# Patient Record
Sex: MALE | Race: WHITE
[De-identification: names, ages, dates, MRNs, and addresses within clinical notes are randomized per-mention and may not be internally consistent; named-entity substitution may affect disease eponyms.]

---

## 2018-01-06 ENCOUNTER — HOSPITAL ENCOUNTER (EMERGENCY)
Dept: HOSPITAL 12 - ER | Age: 28
Discharge: HOME | End: 2018-01-06
Payer: COMMERCIAL

## 2018-01-06 VITALS — WEIGHT: 260 LBS | BODY MASS INDEX: 35.21 KG/M2 | HEIGHT: 72 IN

## 2018-01-06 VITALS — DIASTOLIC BLOOD PRESSURE: 73 MMHG | SYSTOLIC BLOOD PRESSURE: 121 MMHG

## 2018-01-06 DIAGNOSIS — F90.9: ICD-10-CM

## 2018-01-06 DIAGNOSIS — L02.212: Primary | ICD-10-CM

## 2018-01-06 DIAGNOSIS — Z91.018: ICD-10-CM

## 2018-01-06 PROCEDURE — A4663 DIALYSIS BLOOD PRESSURE CUFF: HCPCS

## 2018-01-06 NOTE — NUR
Patient discharged to home in stable conditon.  Written and verbal after care 
instructions given. 

Patient verbalizes understanding of instructions. Ambulated from ER with stable 
gait. All belongings with patient. Accompanied by father from ER.

## 2018-01-10 ENCOUNTER — HOSPITAL ENCOUNTER (EMERGENCY)
Dept: HOSPITAL 12 - ER | Age: 28
Discharge: HOME | End: 2018-01-10
Payer: COMMERCIAL

## 2018-01-10 VITALS — WEIGHT: 260 LBS | HEIGHT: 72 IN | BODY MASS INDEX: 35.21 KG/M2

## 2018-01-10 VITALS — DIASTOLIC BLOOD PRESSURE: 81 MMHG | SYSTOLIC BLOOD PRESSURE: 125 MMHG

## 2018-01-10 DIAGNOSIS — Z48.00: Primary | ICD-10-CM

## 2018-01-10 DIAGNOSIS — Z91.018: ICD-10-CM

## 2018-01-10 DIAGNOSIS — F90.9: ICD-10-CM

## 2018-01-10 PROCEDURE — A4217 STERILE WATER/SALINE, 500 ML: HCPCS

## 2018-01-10 PROCEDURE — A4663 DIALYSIS BLOOD PRESSURE CUFF: HCPCS

## 2019-12-28 ENCOUNTER — HOSPITAL ENCOUNTER (EMERGENCY)
Age: 29
Discharge: HOME | End: 2019-12-28

## 2019-12-28 DIAGNOSIS — J06.9: Primary | ICD-10-CM

## 2019-12-28 DIAGNOSIS — Z79.899: ICD-10-CM

## 2019-12-28 DIAGNOSIS — Z91.018: ICD-10-CM

## 2022-10-15 ENCOUNTER — HOSPITAL ENCOUNTER (EMERGENCY)
Dept: HOSPITAL 12 - ER | Age: 32
Discharge: HOME | End: 2022-10-15
Payer: COMMERCIAL

## 2022-10-15 VITALS — BODY MASS INDEX: 31.56 KG/M2 | HEIGHT: 72 IN | WEIGHT: 233 LBS

## 2022-10-15 VITALS — SYSTOLIC BLOOD PRESSURE: 135 MMHG | DIASTOLIC BLOOD PRESSURE: 68 MMHG

## 2022-10-15 DIAGNOSIS — R23.8: ICD-10-CM

## 2022-10-15 DIAGNOSIS — L03.116: Primary | ICD-10-CM

## 2022-10-15 DIAGNOSIS — Z91.018: ICD-10-CM

## 2022-10-15 DIAGNOSIS — E66.9: ICD-10-CM

## 2022-10-15 PROCEDURE — A4663 DIALYSIS BLOOD PRESSURE CUFF: HCPCS

## 2022-10-15 NOTE — NUR
Patient discharged to home in stable condition.patient went home with all 
belongings .went home with father .wheeled patient via wheelchair to private 
automobile . Written and verbal after care instructions given.v/s wnl . no 
respiratory distress .  

Patient verbalizes understanding of instructions. Stressed follow up or return 
to ER for worsening s/s.

## 2024-09-11 ENCOUNTER — HOSPITAL ENCOUNTER (EMERGENCY)
Dept: HOSPITAL 12 - ER | Age: 34
Discharge: HOME | End: 2024-09-11
Payer: COMMERCIAL

## 2024-09-11 VITALS — DIASTOLIC BLOOD PRESSURE: 79 MMHG | SYSTOLIC BLOOD PRESSURE: 125 MMHG | TEMPERATURE: 98 F | OXYGEN SATURATION: 97 %

## 2024-09-11 VITALS — WEIGHT: 260 LBS | HEIGHT: 70 IN | BODY MASS INDEX: 37.22 KG/M2

## 2024-09-11 DIAGNOSIS — J40: ICD-10-CM

## 2024-09-11 DIAGNOSIS — E66.9: ICD-10-CM

## 2024-09-11 DIAGNOSIS — Z20.822: ICD-10-CM

## 2024-09-11 DIAGNOSIS — Z79.891: ICD-10-CM

## 2024-09-11 DIAGNOSIS — Z88.8: ICD-10-CM

## 2024-09-11 DIAGNOSIS — Z79.899: ICD-10-CM

## 2024-09-11 DIAGNOSIS — J18.9: Primary | ICD-10-CM

## 2024-09-11 DIAGNOSIS — F32.A: ICD-10-CM

## 2024-09-11 PROCEDURE — A4606 OXYGEN PROBE USED W OXIMETER: HCPCS

## 2024-09-11 PROCEDURE — A4663 DIALYSIS BLOOD PRESSURE CUFF: HCPCS

## 2024-10-10 ENCOUNTER — HOSPITAL ENCOUNTER (EMERGENCY)
Dept: HOSPITAL 12 - ER | Age: 34
Discharge: HOME | End: 2024-10-10
Payer: COMMERCIAL

## 2024-10-10 VITALS — WEIGHT: 240 LBS | HEIGHT: 70 IN | BODY MASS INDEX: 34.36 KG/M2

## 2024-10-10 VITALS — DIASTOLIC BLOOD PRESSURE: 78 MMHG | SYSTOLIC BLOOD PRESSURE: 132 MMHG | TEMPERATURE: 98.3 F | OXYGEN SATURATION: 97 %

## 2024-10-10 DIAGNOSIS — E66.9: ICD-10-CM

## 2024-10-10 DIAGNOSIS — Z87.01: ICD-10-CM

## 2024-10-10 DIAGNOSIS — Z79.899: ICD-10-CM

## 2024-10-10 DIAGNOSIS — F32.A: ICD-10-CM

## 2024-10-10 DIAGNOSIS — J32.9: Primary | ICD-10-CM

## 2024-10-10 PROCEDURE — A4606 OXYGEN PROBE USED W OXIMETER: HCPCS

## 2024-10-10 PROCEDURE — A4663 DIALYSIS BLOOD PRESSURE CUFF: HCPCS

## 2024-10-25 ENCOUNTER — HOSPITAL ENCOUNTER (EMERGENCY)
Dept: HOSPITAL 12 - ER | Age: 34
Discharge: HOME | End: 2024-10-25
Payer: COMMERCIAL

## 2024-10-25 VITALS — SYSTOLIC BLOOD PRESSURE: 131 MMHG | TEMPERATURE: 98 F | OXYGEN SATURATION: 95 % | DIASTOLIC BLOOD PRESSURE: 89 MMHG

## 2024-10-25 VITALS — HEIGHT: 70 IN | BODY MASS INDEX: 34.36 KG/M2 | WEIGHT: 240 LBS

## 2024-10-25 DIAGNOSIS — Z79.52: ICD-10-CM

## 2024-10-25 DIAGNOSIS — J32.9: Primary | ICD-10-CM

## 2024-10-25 DIAGNOSIS — E66.9: ICD-10-CM

## 2024-10-25 DIAGNOSIS — Z88.7: ICD-10-CM

## 2024-10-25 DIAGNOSIS — R04.0: ICD-10-CM

## 2024-10-25 DIAGNOSIS — Z79.899: ICD-10-CM

## 2024-10-25 PROCEDURE — 71045 X-RAY EXAM CHEST 1 VIEW: CPT

## 2024-10-25 PROCEDURE — 99283 EMERGENCY DEPT VISIT LOW MDM: CPT

## 2024-10-25 PROCEDURE — A4606 OXYGEN PROBE USED W OXIMETER: HCPCS

## 2024-10-25 PROCEDURE — A4663 DIALYSIS BLOOD PRESSURE CUFF: HCPCS
